# Patient Record
Sex: MALE | Race: BLACK OR AFRICAN AMERICAN | URBAN - METROPOLITAN AREA
[De-identification: names, ages, dates, MRNs, and addresses within clinical notes are randomized per-mention and may not be internally consistent; named-entity substitution may affect disease eponyms.]

---

## 2024-03-26 ENCOUNTER — TELEPHONE (OUTPATIENT)
Dept: PSYCHIATRY | Facility: CLINIC | Age: 24
End: 2024-03-26

## 2024-03-26 NOTE — TELEPHONE ENCOUNTER
Patient has been added to the Medication Management wait list without a referral.    Insurance: [employer]  Insurance Type:    Commercial [x]   Medicaid []   [facility] (if applicable)   Medicare []  Location Preference: [place]  Provider Preference: none  Virtual: Yes [x] No []  Were outside resources sent: Yes [x] No []  Advised the patient to contact his PCP for a referral.     Presenting Problem  ADHD

## 2024-10-01 ENCOUNTER — TELEPHONE (OUTPATIENT)
Age: 24
End: 2024-10-01

## 2024-12-26 ENCOUNTER — TELEPHONE (OUTPATIENT)
Age: 24
End: 2024-12-26

## 2024-12-26 NOTE — TELEPHONE ENCOUNTER
Contacted Pt. in regards to MM Wait List, LVM for pt. to contact [phone], option 3 in regards to scheduling.

## 2024-12-30 NOTE — TELEPHONE ENCOUNTER
"Behavioral Health Outpatient Intake Questions    Referred By   :     Please advise interviewee that they need to answer all questions truthfully to allow for best care, and any misrepresentations of information may affect their ability to be seen at this clinic   => Was this discussed? Yes       Behavioral Health Outpatient Intake History -     Presenting Problem (in patient's own words): Has seen symptoms of ADHD and feels like he can relate to them.     Are there any communication barriers for this patient?     No                                               If yes, please describe barriers:   If there is a unique situation, please refer to Navin Galicia/Laura Avila for final determination.    Are you taking any psychiatric medications? No     If \"YES\" -What are they      If \"YES\" -Who prescribes?     Has the Patient previously received outpatient Talk Therapy or Medication Management from [facility]  No        If \"YES\"- When, Where and with Whom?         If \"NO\" -Has Patient received these services elsewhere?       If \"YES\" -When, Where, and with Whom?    Has the Patient abused alcohol or other substances in the last 6 months ? No  No concerns of substance abuse are reported.     If \"YES\" -What substance, How much, How often?     If illegal substance: Refer to [employer] (for [facility]ANDIS) or [facility].   If Alcohol in excess of 10 drinks per week:  Refer to [employer] (for [facility]AND[facility]) or [facility] Off[facility]    Legal History-     Is this treatment court ordered? No   If \"yes \"send to :  Talk Therapy : Send to Navin Galicia for final determination   Med Management: Send to Dr. Jane for final determination     Has the Patient been convicted of a felony?  No   If \"Yes\" send to -When, What?  Talk Therapy: Send to Navin Galicia for final determination   Med Management: Send to Dr. Jane for final determination     ACCEPTED as a patient Yes  If \"Yes\" Appointment Date: 1/16 11AM Dr Seay     Referred Elsewhere?   If “Yes” " - (Where? Ex: [facility], [facility], [facility], [facility], etc.)       Name of Insurance Co:[employer]   Insurance ID#[id number]   Insurance Phone #  If ins is primary or secondary?  If patient is a minor, parents information such as Name, D.O.B of guarantor.

## 2025-01-06 ENCOUNTER — TELEPHONE (OUTPATIENT)
Dept: PSYCHIATRY | Facility: CLINIC | Age: 25
End: 2025-01-06

## 2025-01-06 NOTE — TELEPHONE ENCOUNTER
One week follow up call for New Patient appointment with   Jed Seay MD   on 01/16/2025 was made on 01/06/2025. Writer informed patient of New Patient paperwork needing to be completed 5 days prior to the appointment. Writer confirmed paperwork has been sent via Mail.    Appointment was made on: 12/30/2024

## 2025-01-10 ENCOUNTER — TELEPHONE (OUTPATIENT)
Dept: PSYCHIATRY | Facility: CLINIC | Age: 25
End: 2025-01-10

## 2025-01-16 ENCOUNTER — TELEMEDICINE (OUTPATIENT)
Dept: PSYCHIATRY | Facility: CLINIC | Age: 25
End: 2025-01-16
Payer: COMMERCIAL

## 2025-01-16 DIAGNOSIS — Z13.29 SCREENING FOR ENDOCRINE, NUTRITIONAL, METABOLIC AND IMMUNITY DISORDER: ICD-10-CM

## 2025-01-16 DIAGNOSIS — Z13.21 SCREENING FOR ENDOCRINE, NUTRITIONAL, METABOLIC AND IMMUNITY DISORDER: ICD-10-CM

## 2025-01-16 DIAGNOSIS — Z13.0 SCREENING FOR ENDOCRINE, NUTRITIONAL, METABOLIC AND IMMUNITY DISORDER: ICD-10-CM

## 2025-01-16 DIAGNOSIS — Z13.228 SCREENING FOR ENDOCRINE, NUTRITIONAL, METABOLIC AND IMMUNITY DISORDER: ICD-10-CM

## 2025-01-16 DIAGNOSIS — E55.9 VITAMIN D DEFICIENCY: ICD-10-CM

## 2025-01-16 DIAGNOSIS — F90.2 ATTENTION DEFICIT HYPERACTIVITY DISORDER (ADHD), COMBINED TYPE: Primary | ICD-10-CM

## 2025-01-16 DIAGNOSIS — Z91.49 HISTORY OF PSYCHOLOGICAL TRAUMA: ICD-10-CM

## 2025-01-16 PROCEDURE — 90792 PSYCH DIAG EVAL W/MED SRVCS: CPT | Performed by: PSYCHIATRY & NEUROLOGY

## 2025-01-16 RX ORDER — ATOMOXETINE 18 MG/1
CAPSULE ORAL
Qty: 94 CAPSULE | Refills: 0 | Status: SHIPPED | OUTPATIENT
Start: 2025-01-16 | End: 2025-03-11

## 2025-01-16 NOTE — PATIENT INSTRUCTIONS
"PLEASE OBTAIN THE FOLLOWING BLOODWORK / LABS:   CBC/diff, CMP, lipid profile, hemoglobin A1C, TSH and free T4, Vitamin D  You can find a list of [facility] labs, as well as schedule an appointment if desired, by going to [email or web address]         Please call the office nursing staff for medication issues including refills, problems getting medications, bothersome side effects, etc., at [phone].     Please return for a follow up appointment as discussed and arrive approximately 15 minutes prior to your appointment time. If you are running late or are unable to attend your appointment, please call our [place] office at [phone] (fax: [phone]), or if you were seen in the [facility] office, please call [phone] (fax [phone]).    National Suicide Prevention Hotline: 1-289.979.7842 or call 657.    To improve sleep:  - Keep a consistent sleep schedule. Get up at the same time every day, even on weekends or during vacations.  - Set a bedtime that is early enough for you to get at least 7-8 hours of sleep.  - Don’t go to bed unless you are sleepy.  - If you don’t fall asleep after 20 minutes, get out of bed and take a brief \"break\". Go to a quiet activity without a lot of light exposure. It is especially important to not get on electronics. During this \"break,\" you might consider sitting in a chair and reading a boring book or listening to soft music, until your eyelids start to feel heavy.  Then, would go back to sleep and try again.    - Establish a relaxing bedtime routine.  - Make your bedroom quiet and relaxing. Keep the room at a comfortable, cool temperature.  - Limit exposure to bright light in the evenings.  - Turn off electronic devices at least 30 minutes before bedtime.  - Avoid watching stressful or suspenseful TV programs right before bedtime.  - Don’t eat a large meal before bedtime. If you are hungry at night, eat a light, healthy snack.  - Exercise " "regularly and maintain a healthy diet.  Participate in regular physical activities like exercise, although avoid approximately 3-4 hours prior to bed.  Morning exercise is ideal.  - Avoid consuming caffeine in the afternoon or evening.  - Avoid consuming alcohol before bedtime.  - Reduce your fluid intake before bedtime.  - Avoid daytime napping.  - Consider reading \"No More Sleepless nights\" by Santos Lowe, Ph.D.  - Consider use of online resources including:  - http://ponUp/cbt-online-insomnia-treatment.html  - http://"[employer]  - CBT-I  Darvin on your Smart Phone.  - Go! To Sleep by the [facility].    Look up \"grounding techniques\" and/or \"anchoring demonstration\" online and try a few to see what may work for you. Practice these skills before you need them, when you are not feeling too anxious or triggered. You can also search for free guided meditation videos online to help improve your head space when you are feeling very anxious or triggered.      Healthy Diet   The American Heart Association and the American College of Cardiology have long recommended a healthy diet for not only patients who are at risk for atherosclerotic cardiovascular disease (ASCVD) but also the general public. In keeping with this evidence-based recommendation, the \"2018 Guideline on the Management of Blood Cholesterol\" stresses that a healthy diet should include adequate intake of these essentials:   Vegetables, fruits, and whole grains   Legumes and nuts   Low-fat dairy products   Low-fat poultry (without the skin)   Fish and seafood   Nontropical vegetable oils     The recent guidelines do provide room for cultural food preferences in a healthy diet, but in general, all patients should limit their intake of saturated and avoid all trans fats, sweets, sugar-sweetened beverages, and red meats.  Please maintain adequate hydration of at least 2 Liters of water per day, and improve nutrition by decreasing portion " sizes, avoiding fried foods, fast foods, inappropriate snacking and overly processed and packaged items with 'added sugars' (whether in drinks or foods), and observing nutritional facts information on any items that are packaged to reduce intake of saturated fats and AVOID trans fats as mentioned above. Again, consume whole foods such as vegetables, higher lean protein intake, and using healthier lifestyle plans such as the Mediterranean diet with healthier fats such as those from seeds, nuts, and using organic extra virgin olive oil or avocado oil in lieu of processed vegetable oils or margarine.    Physical Activity   Recommended DAILY exercise for at least 150 minutes of moderate exercise weekly!  In addition to a healthy diet, all patients should include regular physical activity in their weekly routines, at moderate to vigorous intensity. Any activity is better than nothing, so if your patients can't meet the recommendation of vigorous activity, moderate-intensity activity can still help them reduce their risk of ASCVD.     Below are the American Heart Association's recommendations for physical activity per week (preferably spread throughout the week):     For Overall Cardiovascular Health and Lowering Cholesterol   At least 150 minutes of moderate-intensity physical activity (for example, 30 minutes, 5 days a week), or   At least 75 minutes of vigorous-intensity physical activity (for example, 25 minutes, 3 days a week); or   A combination of moderate- and vigorous-intensity aerobic activity, and   At least 2 days of moderate- to high-intensity muscle-strengthening activities (such as resistance weight training) for additional health benefits    If you have thoughts of harming yourself or are otherwise in psychological crisis, do not hesitate to contact your County Crisis hotline, or 911 or go to the nearest emergency room.  Adult Crisis Numbers  Suicide Prevention Hotline - Dial 9-8-8  [facility]:  249.916.7585 or 812-044-7900  [facility]: [phone]  [facility]: [phone]  [facility]: [phone]  [facility]: [phone] or 1-164.220.1745  [facility]: [phone]  [facility]: [phone] or [facility] Crisis: [phone]  [place] [facility]: 1-658.120.4437 (daytime).       1-527.446.8493 (after hours, weekends, holidays)     Child/Adolescent Crisis Numbers   [facility]: [phone]   [facility]: [phone]   [place]: [phone]   [facility]: [phone]  Please note: Some [facility] do not have a separate number for Child/Adolescent specific crisis. If your county is not listed under Child/Adolescent, please call the adult number for your county     National Talk to Text Line   [facility] - 182-254    National Suicide Prevention Hotline: 1-887.331.3324 or call 174.

## 2025-01-16 NOTE — BH TREATMENT PLAN
TREATMENT PLAN        [facility]    Name and Date of Birth:  Micah Griffiths 24 y.o. 2000  Date of Treatment Plan: January 16, 2025  Diagnosis/Diagnoses:    1. Attention deficit hyperactivity disorder (ADHD), combined type    2. History of psychological trauma    3. Screening for endocrine, nutritional, metabolic and immunity disorder    4. Vitamin D deficiency        Strengths/Personal Resources for Self-Care: supportive family, supportive friends, work skills, ability to listen, ability to reason, ability to communicate needs, willingness to work on problems, ability to understand psychiatric illness, good social [place]    Area/Areas of need: anxiety, attention and concentration problems, ADHD symptoms    Long Term Goal: improve ADHD symptoms  Target Date: 6 months - July 16, 2025  Person/Persons responsible for completion of goal: Micah and Jed Seay MD     Short Term Objective (s) - How will we reach this goal?:   Take medications as prescribed  Attend psychiatry appointments regularly  Get blood work drawn  Continue psychotherapy regularly  Practice coping skills  Try sleep hygiene techniques  Avoid alcohol   Avoid drugs   Take walks regularly  Try breathing exercises  Try relaxation techniques  Target Date: 6 months - July 16, 2025  Person/Persons Responsible for Completion of Goal: Micah     Progress Towards Goals: Continuing treatment    Treatment Modality: medication management every 1-3 months as needed, continue psychotherapy (if patient is currently involved in therapy), and follow up with PCP regularly  Review due 180 days from date of this plan: July 15, 2025   Expected length of service: Ongoing treatment    My physician and I have developed this plan together, and I agree to work on the goals and objectives. I understand the treatment goals that were developed for my treatment.    The treatment plan was created between Jed Seay  MD and Wendell Griffiths on 01/16/25 at 11:56 AM but not signed at the time of the visit due to COVID social distancing. The plan was reviewed, and verbal consent was given.
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
## 2025-02-20 ENCOUNTER — TELEPHONE (OUTPATIENT)
Age: 25
End: 2025-02-20

## 2025-02-20 ENCOUNTER — TELEMEDICINE (OUTPATIENT)
Dept: PSYCHIATRY | Facility: CLINIC | Age: 25
End: 2025-02-20
Payer: COMMERCIAL

## 2025-02-20 DIAGNOSIS — F90.2 ATTENTION DEFICIT HYPERACTIVITY DISORDER (ADHD), COMBINED TYPE: Primary | ICD-10-CM

## 2025-02-20 DIAGNOSIS — F90.2 ATTENTION DEFICIT HYPERACTIVITY DISORDER (ADHD), COMBINED TYPE: ICD-10-CM

## 2025-02-20 DIAGNOSIS — Z91.49 HISTORY OF PSYCHOLOGICAL TRAUMA: ICD-10-CM

## 2025-02-20 PROCEDURE — 90833 PSYTX W PT W E/M 30 MIN: CPT | Performed by: PSYCHIATRY & NEUROLOGY

## 2025-02-20 PROCEDURE — 99214 OFFICE O/P EST MOD 30 MIN: CPT | Performed by: PSYCHIATRY & NEUROLOGY

## 2025-02-20 RX ORDER — ATOMOXETINE 18 MG/1
CAPSULE ORAL
Qty: 94 CAPSULE | Refills: 0 | Status: SHIPPED | OUTPATIENT
Start: 2025-02-20 | End: 2025-02-20

## 2025-02-20 RX ORDER — DEXTROAMPHETAMINE SACCHARATE, AMPHETAMINE ASPARTATE MONOHYDRATE, DEXTROAMPHETAMINE SULFATE AND AMPHETAMINE SULFATE 1.25; 1.25; 1.25; 1.25 MG/1; MG/1; MG/1; MG/1
5 CAPSULE, EXTENDED RELEASE ORAL EVERY MORNING
Qty: 30 CAPSULE | Refills: 0 | Status: SHIPPED | OUTPATIENT
Start: 2025-02-20

## 2025-02-20 RX ORDER — ATOMOXETINE 18 MG/1
CAPSULE ORAL
Qty: 94 CAPSULE | Refills: 0 | Status: SHIPPED | OUTPATIENT
Start: 2025-02-20

## 2025-02-20 NOTE — TELEPHONE ENCOUNTER
Patient called office requesting rx that were sent to [facility] be sent to new pharmacy, [facility] in [place] due to [facility] not able to fill at their pharmacy. Writer placed new pharmacy in system and informed patient the message would be sent to the provider.
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
## 2025-02-21 NOTE — ADDENDUM NOTE
Addended by: PRABHAKAR BERNARD on: 2/20/2025 08:54 PM     Modules accepted: Orders     Case Management consulted to arrange for home enteral supplies.    Met with patient, discussed home enteral supply needs.  Verified demographic info is correct as found in Epic.  Explained hospital affiliation with [facility].  Patient declined agency list and is agreeable to [facility]. Discussed home enteral plan with LIVIA and attending MD. Completed the Medicare Enteral Feeding form.     Call placed to [facility] staff Justin ([phone]). Patient is with the [place] area.  Faxed Medicare Enteral Feeding form to Justin ([phone]).

## 2025-02-24 DIAGNOSIS — F90.2 ATTENTION DEFICIT HYPERACTIVITY DISORDER (ADHD), COMBINED TYPE: ICD-10-CM

## 2025-02-24 RX ORDER — DEXTROAMPHETAMINE SACCHARATE, AMPHETAMINE ASPARTATE MONOHYDRATE, DEXTROAMPHETAMINE SULFATE AND AMPHETAMINE SULFATE 1.25; 1.25; 1.25; 1.25 MG/1; MG/1; MG/1; MG/1
5 CAPSULE, EXTENDED RELEASE ORAL EVERY MORNING
Qty: 30 CAPSULE | Refills: 0 | Status: SHIPPED | OUTPATIENT
Start: 2025-02-24

## 2025-02-24 NOTE — TELEPHONE ENCOUNTER
Reason for call:   [x] Refill   [] Prior Auth  [x] Other: NOT A DUPLICATE. This is a pharmacy change, The patient stated he called last week to have this changed to a different pharmacy and has not heard anything. I do not see this request in his chart so I am requesting as a HP due to the patient being out of medication at this time.    Office:   [] PCP/Provider -   [x] Specialty/Provider -   Ordering Department:  [facility]  Authorized By: Jed Seay MD    Medication: amphetamine-dextroamphetamine (ADDERALL XR, 5MG,) 5 MG 24 hr capsule     Dose/Frequency:  Take 1 capsule (5 mg total) by mouth every morning Max Daily Amount: 5 mg     Quantity: 30    Pharmacy: [facility]/pharmacy #[id number] - [place] - [address] [phone]    Does the patient have enough for 3 days?   [] Yes   [x] No - Send as HP to POD

## 2025-02-24 NOTE — TELEPHONE ENCOUNTER
One week follow up call for New Patient appointment with Jed Seay MD on 04/10/2025 was made on 02/24/2025. Writer informed patient of New Patient paperwork needing to be completed 5 days prior to the appointment. Writer confirmed paperwork has been sent via [employer].    Appointment was made on: 02/20/2025

## 2025-02-25 NOTE — TELEPHONE ENCOUNTER
Refill being sent to different pharmacy.    Patient requested refill for:  Requested Prescriptions     Pending Prescriptions Disp Refills    amphetamine-dextroamphetamine (ADDERALL XR, 5MG,) 5 MG 24 hr capsule 30 capsule 0     Sig: Take 1 capsule (5 mg total) by mouth every morning Max Daily Amount: 5 mg       PDMP reviewed on 02/24/25. Micah has been appropriately adherent to their controlled psychotropic medication regimen in the absence of apparent abuse or misuse. Therefore, this writer will send a 30-day refill to their pharmacy of choice including recommendation for patient to adhere to their outpatient appointment(s) with this writer to assure appropriate continuity of care.      Jed Seay MD

## 2025-03-21 DIAGNOSIS — F90.2 ATTENTION DEFICIT HYPERACTIVITY DISORDER (ADHD), COMBINED TYPE: ICD-10-CM

## 2025-03-21 RX ORDER — DEXTROAMPHETAMINE SACCHARATE, AMPHETAMINE ASPARTATE MONOHYDRATE, DEXTROAMPHETAMINE SULFATE AND AMPHETAMINE SULFATE 1.25; 1.25; 1.25; 1.25 MG/1; MG/1; MG/1; MG/1
5 CAPSULE, EXTENDED RELEASE ORAL EVERY MORNING
Qty: 30 CAPSULE | Refills: 0 | Status: SHIPPED | OUTPATIENT
Start: 2025-03-21

## 2025-03-21 NOTE — TELEPHONE ENCOUNTER
Patient requested refill for:  Requested Prescriptions     Pending Prescriptions Disp Refills    amphetamine-dextroamphetamine (ADDERALL XR, 5MG,) 5 MG 24 hr capsule 30 capsule 0     Sig: Take 1 capsule (5 mg total) by mouth every morning Max Daily Amount: 5 mg       PDMP reviewed on 03/21/25. Micah has been appropriately adherent to their controlled psychotropic medication regimen in the absence of apparent abuse or misuse. Therefore, this writer will send a 30-day refill to their pharmacy of choice including recommendation for patient to adhere to their outpatient appointment(s) with this writer to assure appropriate continuity of care.      Jed Seay MD

## 2025-03-21 NOTE — TELEPHONE ENCOUNTER
02/25/2025 02/24/2025 Mixed Amphetamine Salts (Capsule, Extended Release) 30.0 30 5 MG NA PRABHAKAR BERNARD [facility] Private Pay 0 / 0 [place]

## 2025-03-21 NOTE — TELEPHONE ENCOUNTER
Reason for call:   [x] Refill   [] Prior Auth  [] Other:     Office:   [] PCP/Provider -   [x] Specialty/Provider - Psychiatry    Medication:     amphetamine-dextroamphetamine (ADDERALL XR, 5MG,) 5 MG 24 hr capsule       Dose/Frequency: 5 mg, Oral, Every morning     Quantity: 30    Pharmacy:  [facility]/pharmacy #[id number] - [place], [place] - [address]     Local Pharmacy   Does the patient have enough for 3 days?   [x] Yes   [] No - Send as HP to POD    Mail Away Pharmacy   Does the patient have enough for 10 days?   [] Yes   [] No - Send as HP to POD

## 2025-04-10 ENCOUNTER — TELEMEDICINE (OUTPATIENT)
Dept: PSYCHIATRY | Facility: CLINIC | Age: 25
End: 2025-04-10

## 2025-04-10 ENCOUNTER — TELEPHONE (OUTPATIENT)
Age: 25
End: 2025-04-10

## 2025-04-10 DIAGNOSIS — F90.2 ATTENTION DEFICIT HYPERACTIVITY DISORDER (ADHD), COMBINED TYPE: Primary | ICD-10-CM

## 2025-04-10 DIAGNOSIS — Z91.49 HISTORY OF PSYCHOLOGICAL TRAUMA: ICD-10-CM

## 2025-04-10 DIAGNOSIS — F90.2 ATTENTION DEFICIT HYPERACTIVITY DISORDER (ADHD), COMBINED TYPE: ICD-10-CM

## 2025-04-10 RX ORDER — DEXTROAMPHETAMINE SACCHARATE, AMPHETAMINE ASPARTATE MONOHYDRATE, DEXTROAMPHETAMINE SULFATE AND AMPHETAMINE SULFATE 2.5; 2.5; 2.5; 2.5 MG/1; MG/1; MG/1; MG/1
10 CAPSULE, EXTENDED RELEASE ORAL EVERY MORNING
Qty: 30 CAPSULE | Refills: 0 | Status: SHIPPED | OUTPATIENT
Start: 2025-04-10

## 2025-04-10 RX ORDER — DEXTROAMPHETAMINE SACCHARATE, AMPHETAMINE ASPARTATE MONOHYDRATE, DEXTROAMPHETAMINE SULFATE AND AMPHETAMINE SULFATE 2.5; 2.5; 2.5; 2.5 MG/1; MG/1; MG/1; MG/1
10 CAPSULE, EXTENDED RELEASE ORAL EVERY MORNING
Qty: 30 CAPSULE | Refills: 0 | Status: SHIPPED | OUTPATIENT
Start: 2025-04-10 | End: 2025-04-10 | Stop reason: SDUPTHER
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
## 2025-04-10 NOTE — TELEPHONE ENCOUNTER
Pt called in and stated he needs the ADDERALL XR 10 mg resent to [facility] on [facility]. It was sent to [facility]. Writer removed [facility] off the pts chart as requested.

## 2025-04-10 NOTE — PATIENT INSTRUCTIONS
"    Please call the office nursing staff for medication issues including refills, problems getting medications, bothersome side effects, etc., at [phone].     Please return for a follow up appointment as discussed and arrive approximately 15 minutes prior to your appointment time. If you are running late or are unable to attend your appointment, please call our [place] office at [phone] (fax: [phone]), or if you were seen in the [facility] office, please call [phone] (fax [phone]).    National Suicide Prevention Hotline: 1-169.202.6207 or call 818.    To improve sleep:  - Keep a consistent sleep schedule. Get up at the same time every day, even on weekends or during vacations.  - Set a bedtime that is early enough for you to get at least 7-8 hours of sleep.  - Don’t go to bed unless you are sleepy.  - If you don’t fall asleep after 20 minutes, get out of bed and take a brief \"break\". Go to a quiet activity without a lot of light exposure. It is especially important to not get on electronics. During this \"break,\" you might consider sitting in a chair and reading a boring book or listening to soft music, until your eyelids start to feel heavy.  Then, would go back to sleep and try again.    - Establish a relaxing bedtime routine.  - Make your bedroom quiet and relaxing. Keep the room at a comfortable, cool temperature.  - Limit exposure to bright light in the evenings.  - Turn off electronic devices at least 30 minutes before bedtime.  - Avoid watching stressful or suspenseful TV programs right before bedtime.  - Don’t eat a large meal before bedtime. If you are hungry at night, eat a light, healthy snack.  - Exercise regularly and maintain a healthy diet.  Participate in regular physical activities like exercise, although avoid approximately 3-4 hours prior to bed.  Morning exercise is ideal.  - Avoid consuming caffeine in the afternoon or evening.  - Avoid consuming alcohol " "before bedtime.  - Reduce your fluid intake before bedtime.  - Avoid daytime napping.  - Consider reading \"No More Sleepless nights\" by Santos Lowe, Ph.D.  - Consider use of online resources including:  - http://[employer]/cbt-online-insomnia-treatment.html  - http://www.[employer]  - CBT-I  Darvin on your Smart Phone.  - Go! To Sleep by the [facility].    Look up \"grounding techniques\" and/or \"anchoring demonstration\" online and try a few to see what may work for you. Practice these skills before you need them, when you are not feeling too anxious or triggered. You can also search for free guided meditation videos online to help improve your head space when you are feeling very anxious or triggered.      Healthy Diet   The American Heart Association and the American College of Cardiology have long recommended a healthy diet for not only patients who are at risk for atherosclerotic cardiovascular disease (ASCVD) but also the general public. In keeping with this evidence-based recommendation, the \"2018 Guideline on the Management of Blood Cholesterol\" stresses that a healthy diet should include adequate intake of these essentials:   Vegetables, fruits, and whole grains   Legumes and nuts   Low-fat dairy products   Low-fat poultry (without the skin)   Fish and seafood   Nontropical vegetable oils     The recent guidelines do provide room for cultural food preferences in a healthy diet, but in general, all patients should limit their intake of saturated and avoid all trans fats, sweets, sugar-sweetened beverages, and red meats.  Please maintain adequate hydration of at least 2 Liters of water per day, and improve nutrition by decreasing portion sizes, avoiding fried foods, fast foods, inappropriate snacking and overly processed and packaged items with 'added sugars' (whether in drinks or foods), and observing nutritional facts information on any items that are packaged to reduce intake of saturated fats and " AVOID trans fats as mentioned above. Again, consume whole foods such as vegetables, higher lean protein intake, and using healthier lifestyle plans such as the Mediterranean diet with healthier fats such as those from seeds, nuts, and using organic extra virgin olive oil or avocado oil in lieu of processed vegetable oils or margarine.    Physical Activity   Recommended DAILY exercise for at least 150 minutes of moderate exercise weekly!  In addition to a healthy diet, all patients should include regular physical activity in their weekly routines, at moderate to vigorous intensity. Any activity is better than nothing, so if your patients can't meet the recommendation of vigorous activity, moderate-intensity activity can still help them reduce their risk of ASCVD.     Below are the American Heart Association's recommendations for physical activity per week (preferably spread throughout the week):     For Overall Cardiovascular Health and Lowering Cholesterol   At least 150 minutes of moderate-intensity physical activity (for example, 30 minutes, 5 days a week), or   At least 75 minutes of vigorous-intensity physical activity (for example, 25 minutes, 3 days a week); or   A combination of moderate- and vigorous-intensity aerobic activity, and   At least 2 days of moderate- to high-intensity muscle-strengthening activities (such as resistance weight training) for additional health benefits    If you have thoughts of harming yourself or are otherwise in psychological crisis, do not hesitate to contact your County Crisis hotline, or 911 or go to the nearest emergency room.  Adult Crisis Numbers  Suicide Prevention Hotline - Dial 9-8-8  [facility]: [phone] or [phone]  [facility]: [phone]  [facility]: [phone]  [facility]: [phone]  [place]: [phone] or [phone]  [facility]: [phone]  [facility]: [phone] or [facility] Crisis:  [phone]  [place] [facility]: [phone] (daytime).       [phone] (after hours, weekends, holidays)     Child/Adolescent Crisis Numbers   [facility]: [phone]   [facility]: [phone]   [place]: [phone]   [place]: [phone]  Please note: Some [facility] do not have a separate number for Child/Adolescent specific crisis. If your county is not listed under Child/Adolescent, please call the adult number for your county     National Talk to Text Line   [employer] - [phone]    National Suicide Prevention Hotline: 1-176.616.7687 or call 495.

## 2025-05-15 ENCOUNTER — TELEMEDICINE (OUTPATIENT)
Dept: PSYCHIATRY | Facility: CLINIC | Age: 25
End: 2025-05-15

## 2025-05-15 DIAGNOSIS — Z91.49 HISTORY OF PSYCHOLOGICAL TRAUMA: ICD-10-CM

## 2025-05-15 DIAGNOSIS — F90.2 ATTENTION DEFICIT HYPERACTIVITY DISORDER (ADHD), COMBINED TYPE: Primary | ICD-10-CM

## 2025-05-15 PROCEDURE — 99214 OFFICE O/P EST MOD 30 MIN: CPT | Performed by: PSYCHIATRY & NEUROLOGY

## 2025-05-15 RX ORDER — DEXTROAMPHETAMINE SACCHARATE, AMPHETAMINE ASPARTATE MONOHYDRATE, DEXTROAMPHETAMINE SULFATE AND AMPHETAMINE SULFATE 3.75; 3.75; 3.75; 3.75 MG/1; MG/1; MG/1; MG/1
15 CAPSULE, EXTENDED RELEASE ORAL EVERY MORNING
Qty: 30 CAPSULE | Refills: 0 | Status: SHIPPED | OUTPATIENT
Start: 2025-05-15
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
## 2025-06-11 DIAGNOSIS — F90.2 ATTENTION DEFICIT HYPERACTIVITY DISORDER (ADHD), COMBINED TYPE: ICD-10-CM

## 2025-06-11 NOTE — TELEPHONE ENCOUNTER
Reason for call:   [x] Refill   [] Prior Auth  [] Other:     Office:   [] PCP/Provider -   [x] Specialty/Provider -     Medication: amphetamine-dextroamphetamine (ADDERALL XR, 15MG,) 15 MG 24 hr capsule     Dose/Frequency: Take 1 capsule (15 mg total) by mouth every morning     Quantity: 30    Pharmacy: [facility]  [address]     Local Pharmacy   Does the patient have enough for 3 days?   [x] Yes   [] No - Send as HP to POD

## 2025-06-11 NOTE — TELEPHONE ENCOUNTER
Refill must be reviewed and completed by the office or provider. The refill is unable to be approved or denied by the medication management team.      05/15/2025 05/15/2025 Mixed Amphetamine Salt (Capsule, Extended Release) 30.0 30 15 MG NA [facility] Private Pay 0 / 0 [place]   1 [id number] ** 04/10/2025 04/10/2025 Mixed Amphetamine Salt (Capsule, Extended Release) 30.0 30 10 MG NA [facility] Private Pay 0 / 0 [place]   1 [id number] ** 03/24/2025 03/21/2025 Mixed Amphetamine Salts (Capsule, Extended Release) 30.0 30 5 MG NA [facility] Private Pay 0 / 0 [place]

## 2025-06-12 RX ORDER — DEXTROAMPHETAMINE SACCHARATE, AMPHETAMINE ASPARTATE MONOHYDRATE, DEXTROAMPHETAMINE SULFATE AND AMPHETAMINE SULFATE 3.75; 3.75; 3.75; 3.75 MG/1; MG/1; MG/1; MG/1
15 CAPSULE, EXTENDED RELEASE ORAL EVERY MORNING
Qty: 30 CAPSULE | Refills: 0 | Status: SHIPPED | OUTPATIENT
Start: 2025-06-12

## 2025-06-24 ENCOUNTER — TELEPHONE (OUTPATIENT)
Dept: PSYCHIATRY | Facility: CLINIC | Age: 25
End: 2025-06-24

## 2025-06-30 NOTE — TELEPHONE ENCOUNTER
Patient contacted the office to schedule a follow up visit with provider. Patient is now scheduled for 8/11/2025  at 11:30 am virtually with Dr Seay. Appt added to cancellation list..

## 2025-07-10 ENCOUNTER — TELEPHONE (OUTPATIENT)
Dept: PSYCHIATRY | Facility: CLINIC | Age: 25
End: 2025-07-10

## 2025-07-10 DIAGNOSIS — F90.2 ATTENTION DEFICIT HYPERACTIVITY DISORDER (ADHD), COMBINED TYPE: ICD-10-CM

## 2025-07-10 RX ORDER — DEXTROAMPHETAMINE SACCHARATE, AMPHETAMINE ASPARTATE MONOHYDRATE, DEXTROAMPHETAMINE SULFATE AND AMPHETAMINE SULFATE 5; 5; 5; 5 MG/1; MG/1; MG/1; MG/1
20 CAPSULE, EXTENDED RELEASE ORAL EVERY MORNING
Qty: 30 CAPSULE | Refills: 0 | Status: SHIPPED | OUTPATIENT
Start: 2025-07-10

## 2025-07-10 NOTE — TELEPHONE ENCOUNTER
Called Micah ([phone]) - he is requesting an increase in dose. He is tolerating 15 mg well but still crashing early in the day and feels the benefits don't last very long.

## 2025-07-10 NOTE — TELEPHONE ENCOUNTER
Patient called the RX Refill Line. Message is being forwarded to the office.     Patient called for a refill for the adderall but is asking if Dr Seay would increase the dosage. Patient currently taking 15 mg daily.    [facility]    Has 2 pill remaining.     Please contact patient at [phone]
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
## 2025-08-11 ENCOUNTER — TELEMEDICINE (OUTPATIENT)
Dept: PSYCHIATRY | Facility: CLINIC | Age: 25
End: 2025-08-11

## 2025-08-15 ENCOUNTER — TELEPHONE (OUTPATIENT)
Age: 25
End: 2025-08-15